# Patient Record
(demographics unavailable — no encounter records)

---

## 2018-12-13 NOTE — ER DOCUMENT REPORT
ED Medical Screen (RME)





- General


Chief Complaint: Suicidal Ideation


Stated Complaint: SUICIDAL IDEATION


Time Seen by Provider: 12/13/18 15:18


Mode of Arrival: Ambulatory


Information source: Patient, Parent


Notes: 





child presents with her mother for C/O SI, reports hx of attempted SI.Was seen 

by counselor today.








I have greeted and performed a rapid initial assessment of this patient.  A 

comprehensive ED assessment and evaluation of the patient, analysis of test 

results and completion of the medical decision making process will be conducted 

by additional ED providers.


TRAVEL OUTSIDE OF THE [place] IN LAST 30 DAYS: No





- Related Data


Allergies/Adverse Reactions: 


 





No Known Allergies Allergy (Unverified 07/10/15 19:58)


 











Past Medical History





- Past Medical History


Cardiac Medical History: 


   Denies: Hx Coronary Artery Disease, Hx Heart Attack, Hx Hypertension


Pulmonary Medical History: 


   Denies: Hx Asthma, Hx Bronchitis, Hx COPD, Hx Pneumonia


Neurological Medical History: Denies: Hx Cerebrovascular Accident, Hx Seizures


Musculoskeltal Medical History: Denies Hx Arthritis





- Immunizations


Immunizations up to date: Yes


Hx Diphtheria, Pertussis, Tetanus Vaccination: Yes





Physical Exam





- Vital signs


Vitals: 


 











Temp Pulse Resp BP Pulse Ox


 


 98.2 F   87   18   102/65   100 


 


 12/13/18 15:18  12/13/18 15:18  12/13/18 15:18  12/13/18 15:18  12/13/18 15:18














Course





- Vital Signs


Vital signs: 


 











Temp Pulse Resp BP Pulse Ox


 


 98.2 F   87   18   102/65   100 


 


 12/13/18 15:18  12/13/18 15:18  12/13/18 15:18  12/13/18 15:18  12/13/18 15:18














Doctor's Discharge





- Discharge


Referrals: 


SOBEIDA KIRKLAND NP [Primary Care Provider] - Follow up as needed

## 2018-12-13 NOTE — ER DOCUMENT REPORT
ED Psych Disorder / Suicide





- General


Chief Complaint: Suicidal Ideation


Stated Complaint: SUICIDAL IDEATION


Time Seen by Provider: 12/13/18 15:18


Mode of Arrival: Ambulatory


Notes: 





Patient is here because she is beginning to have suicidal thoughts.  Over 

recent months, she is lost a couple of friends to suicide.  Patient has seen a 

local counselor twice in the past couple of weeks but none before that.  She is 

not on any current medications for any medical problems.  She says she has been 

having some diarrhea.  Having some shortness of breath as well.  Not asthmatic.

  No surgeries.  On no regular prescription medicines for anything.  

Acknowledges smoking pot, but denies any other drugs.


TRAVEL OUTSIDE OF THE [place] IN LAST 30 DAYS: No





- Related Data


Allergies/Adverse Reactions: 


 





No Known Allergies Allergy (Unverified 07/10/15 19:58)


 











Past Medical History





- General


Information source: Patient, Parent





- Social History


Smoking Status: Unknown if Ever Smoked


Frequency of alcohol use: Occasional


Drug Abuse: Marijuana


Family History: Reviewed & Not Pertinent


Patient has suicidal ideation: Yes


Patient has homicidal ideation: No





- Immunizations


Immunizations up to date: Yes


Hx Diphtheria, Pertussis, Tetanus Vaccination: Yes





Review of Systems





- Review of Systems


Notes: 





REVIEW OF SYSTEMS:





CONSTITUTIONAL :  Denies fever.


  


EENT:   Denies eye, ear, nose or mouth or throat pain or other symptoms.





CARDIOVASCULAR:  Denies chest pain.





RESPIRATORY:  Denies cough, chest congestion, but has occasional shortness of 

breath.





GASTROINTESTINAL:  Denies abdominal pain or nausea, vomiting, but is having 

some diarrhea.





GENITOURINARY:  Denies difficulty or painful urinating, urinary frequency, 

blood in urine.





MUSCULOSKELETAL:  Denies back or neck pain.  Denies joint pain or swelling.





SKIN:   Denies rash or skin lesions.





NEUROLOGICAL:  Denies LOC or altered mental status.  Denies headache.  Denies 

sensory loss or motor deficits.





ALL OTHER SYSTEMS REVIEWED AND NEGATIVE.





Physical Exam





- Vital signs


Vitals: 


 











Temp Pulse Resp BP Pulse Ox


 


 98.2 F   87   18   102/65   100 


 


 12/13/18 15:18  12/13/18 15:18  12/13/18 15:18  12/13/18 15:18  12/13/18 15:18











Interpretation: Normal


Notes: 





PHYSICAL EXAMINATION:





GENERAL: Well-appearing, in no acute distress.





HEAD: Atraumatic, normocephalic.





EYES: Pupils equal round and reactive to light, extraocular movements intact.





ENT: oropharynx clear without exudates.  Moist mucous membranes.





NECK: Normal range of motion, supple.





LUNGS: Breath sounds clear and equal bilaterally.





HEART: Regular rate and rhythm without murmurs.





ABDOMEN: Soft, nontender.  No guarding or rebound.  No masses.





BACK:  No tenderness throughout entire back.





EXTREMITIES: Normal range of motion without pain.





NEUROLOGICAL:  Normal speech, normal gait.  Normal sensory, motor, and reflex 

exams.  Awake, alert, and oriented x3.  Cranial nerves normal.





PSYCH: Normal mood, normal affect.





SKIN: Warm, dry, no rashes.





Course





- Re-evaluation


Re-evalutation: 





12/13/18 16:20


Mental health has evaluated the patient and feels that we need to keep her 

overnight to get her on medications for her depression.











- Vital Signs


Vital signs: 


 











Temp Pulse Resp BP Pulse Ox


 


 98.2 F   87   18   102/65   100 


 


 12/13/18 15:18  12/13/18 15:18  12/13/18 15:18  12/13/18 15:18  12/13/18 15:18














- Laboratory


Result Diagrams: 


 12/13/18 16:13





 12/13/18 16:13


Laboratory results interpreted by me: 


 











  12/13/18





  16:13


 


Ur Leukocyte Esterase  TRACE H














Discharge





- Discharge


Referrals: 


SOBEIDA KIRKLAND NP [NO LOCAL MD] - Follow up as needed

## 2018-12-14 NOTE — ER DOCUMENT REPORT
Doctor's Note


Notes: 





12/14/18 09:26


16-year-old female who presents with some passive suicidal ideations with a 

recent friends committing suicide.  Patient is on no medications.  Labs as 

recorded.  Vital signs are stable.  Awaiting psychiatric evaluation.





12/14/18 14:48


Labs as recorded.  Patient is denying any suicidal ideations at this time.  Mom 

is at bedside and is very comfortable taking the patient home.  Patient denies 

any pain.  We have provided follow-up with [facility].  Strict 

return precautions have been explained.

## 2019-06-15 NOTE — PSYCHOLOGICAL NOTE
Psych Note





- Psych Note


Date seen by psych provider: 12/13/18


Time seen by psych provider: 15:45


Psych Note: 


No medication recommendations at this time





Patient is recommended for IVC petition for overnight mental health 

observation.  Patient will be reevaluated.  Dr. Clay was consulted and care 

management this patient; attending physician is agreement with recommendations 

and disposition.
The patient is a 80y Male complaining of abdominal pain.